# Patient Record
Sex: FEMALE | Race: ASIAN | NOT HISPANIC OR LATINO | Employment: FULL TIME | URBAN - METROPOLITAN AREA
[De-identification: names, ages, dates, MRNs, and addresses within clinical notes are randomized per-mention and may not be internally consistent; named-entity substitution may affect disease eponyms.]

---

## 2017-07-20 ENCOUNTER — EMPLOYEE HEALTH (OUTPATIENT)
Dept: OCCUPATIONAL MEDICINE | Facility: CLINIC | Age: 42
End: 2017-07-20

## 2017-07-20 ENCOUNTER — EH NON-PROVIDER (OUTPATIENT)
Dept: OCCUPATIONAL MEDICINE | Facility: CLINIC | Age: 42
End: 2017-07-20

## 2017-07-20 ENCOUNTER — HOSPITAL ENCOUNTER (OUTPATIENT)
Facility: MEDICAL CENTER | Age: 42
End: 2017-07-20
Attending: PREVENTIVE MEDICINE
Payer: COMMERCIAL

## 2017-07-20 VITALS
WEIGHT: 99 LBS | HEIGHT: 64 IN | BODY MASS INDEX: 16.9 KG/M2 | RESPIRATION RATE: 16 BRPM | DIASTOLIC BLOOD PRESSURE: 84 MMHG | SYSTOLIC BLOOD PRESSURE: 108 MMHG | OXYGEN SATURATION: 97 % | HEART RATE: 80 BPM | TEMPERATURE: 97.9 F

## 2017-07-20 DIAGNOSIS — Z02.1 PRE-EMPLOYMENT DRUG SCREENING: ICD-10-CM

## 2017-07-20 DIAGNOSIS — Z02.1 PHYSICAL EXAM, PRE-EMPLOYMENT: ICD-10-CM

## 2017-07-20 LAB
AMP AMPHETAMINE: NORMAL
BAR BARBITURATES: NORMAL
BZO BENZODIAZEPINES: NORMAL
COC COCAINE: NORMAL
INT CON NEG: NORMAL
INT CON POS: NORMAL
MDMA ECSTASY: NORMAL
MET METHAMPHETAMINES: NORMAL
MTD METHADONE: NORMAL
OPI OPIATES: NORMAL
OXY OXYCODONE: NORMAL
PCP PHENCYCLIDINE: NORMAL
POC URINE DRUG SCREEN OCDRS: NEGATIVE
THC: NORMAL
VZV IGG SER IA-ACNC: POSITIVE

## 2017-07-20 PROCEDURE — 86787 VARICELLA-ZOSTER ANTIBODY: CPT | Performed by: PREVENTIVE MEDICINE

## 2017-07-20 PROCEDURE — 8915 PR COMPREHENSIVE PHYSICAL: Performed by: PREVENTIVE MEDICINE

## 2017-07-20 PROCEDURE — 86480 TB TEST CELL IMMUN MEASURE: CPT | Performed by: PREVENTIVE MEDICINE

## 2017-07-20 PROCEDURE — 94375 RESPIRATORY FLOW VOLUME LOOP: CPT | Performed by: PREVENTIVE MEDICINE

## 2017-07-20 PROCEDURE — 80305 DRUG TEST PRSMV DIR OPT OBS: CPT | Performed by: PREVENTIVE MEDICINE

## 2017-07-20 ASSESSMENT — VISUAL ACUITY
OD_CC: 20/25
OS_CC: 20/50

## 2017-07-20 NOTE — MR AVS SNAPSHOT
Patti Garciaann-marie   2017 8:40 AM   Employee Health   MRN: [id number]    Department:  [facility]   Dept Phone:  [phone]    Description:  Female : 1975   Provider:  Sagar Ham D.O.           Allergies as of 2017     Not on File      You were diagnosed with     Physical exam, pre-employment   [[id number]]         Basic Information     Date Of Birth Sex Race Ethnicity Preferred Language    1975 Female  Non- English      Health Maintenance     Patient has no pending health maintenance at this time      Current Immunizations     No immunizations on file.      Below and/or attached are the medications your provider expects you to take. Review all of your home medications and newly ordered medications with your provider and/or pharmacist. Follow medication instructions as directed by your provider and/or pharmacist. Please keep your medication list with you and share with your provider. Update the information when medications are discontinued, doses are changed, or new medications (including over-the-counter products) are added; and carry medication information at all times in the event of emergency situations     Allergies:  No Known Allergies          Medications  Valid as of: 2017 -  8:58 AM    Generic Name Brand Name Tablet Size Instructions for use    .                 Medicines prescribed today were sent to:     None      Medication refill instructions:       If your prescription bottle indicates you have medication refills left, it is not necessary to call your provider’s office. Please contact your pharmacy and they will refill your medication.    If your prescription bottle indicates you do not have any refills left, you may request refills at any time through one of the following ways: The online [employer] system (except Urgent Care), by calling your provider’s office, or by asking your pharmacy to contact your provider’s office with a refill request.  Medication refills are processed only during regular business hours and may not be available until the next business day. Your provider may request additional information or to have a follow-up visit with you prior to refilling your medication.   *Please Note: Medication refills are assigned a new Rx number when refilled electronically. Your pharmacy may indicate that no refills were authorized even though a new prescription for the same medication is available at the pharmacy. Please request the medicine by name with the pharmacy before contacting your provider for a refill.           [employer] Access Code: Activation code not generated  Current [employer] Status: Active

## 2017-07-21 LAB
M TB TUBERC IFN-G BLD QL: NEGATIVE
M TB TUBERC IFN-G/MITOGEN IGNF BLD: 0
M TB TUBERC IGNF/MITOGEN IGNF CONTROL: 5.83 [IU]/ML
MITOGEN IGNF BCKGRD COR BLD-ACNC: 0.05 [IU]/ML

## 2017-08-04 ENCOUNTER — OFFICE VISIT (OUTPATIENT)
Dept: URGENT CARE | Facility: CLINIC | Age: 42
End: 2017-08-04
Payer: COMMERCIAL

## 2017-08-04 VITALS
SYSTOLIC BLOOD PRESSURE: 100 MMHG | BODY MASS INDEX: 17.07 KG/M2 | WEIGHT: 100 LBS | HEIGHT: 64 IN | DIASTOLIC BLOOD PRESSURE: 70 MMHG | OXYGEN SATURATION: 98 % | TEMPERATURE: 98.4 F | RESPIRATION RATE: 15 BRPM | HEART RATE: 70 BPM

## 2017-08-04 DIAGNOSIS — H10.33 ACUTE BACTERIAL CONJUNCTIVITIS OF BOTH EYES: ICD-10-CM

## 2017-08-04 PROCEDURE — 99203 OFFICE O/P NEW LOW 30 MIN: CPT | Performed by: PHYSICIAN ASSISTANT

## 2017-08-04 RX ORDER — OFLOXACIN 3 MG/ML
SOLUTION/ DROPS OPHTHALMIC
Qty: 5 ML | Refills: 1 | Status: SHIPPED | OUTPATIENT
Start: 2017-08-04 | End: 2017-11-29

## 2017-08-04 NOTE — MR AVS SNAPSHOT
"Patti Clinton   2017 6:30 PM   Office Visit   MRN: [id number]    Department:  [facility]   Dept Phone:  [phone]    Description:  Female : 1975   Provider:  Kofi Walters PA-C           Reason for Visit     Conjunctivitis discharge, x2days, bumps top eye lid      Allergies as of 2017     Not on File      You were diagnosed with     Acute bacterial conjunctivitis of both eyes   [[id number]]         Vital Signs     Blood Pressure Pulse Temperature Respirations Height Weight    100/70 mmHg 70 36.9 °C (98.4 °F) 15 1.626 m (5' 4\") 45.36 kg (100 lb)    Body Mass Index Oxygen Saturation                17.16 kg/m2 98%          Basic Information     Date Of Birth Sex Race Ethnicity Preferred Language    1975 Female  Non- English      Health Maintenance        Date Due Completion Dates    IMM DTaP/Tdap/Td Vaccine (1 - Tdap) 3/14/1992 ---    PAP SMEAR 3/14/1994 ---    MAMMOGRAM 3/14/2013 ---    IMM INFLUENZA (1) 2017 ---            Current Immunizations     No immunizations on file.      Below and/or attached are the medications your provider expects you to take. Review all of your home medications and newly ordered medications with your provider and/or pharmacist. Follow medication instructions as directed by your provider and/or pharmacist. Please keep your medication list with you and share with your provider. Update the information when medications are discontinued, doses are changed, or new medications (including over-the-counter products) are added; and carry medication information at all times in the event of emergency situations     Allergies:  No Known Allergies          Medications  Valid as of: 2017 -  7:14 PM    Generic Name Brand Name Tablet Size Instructions for use    Ofloxacin (Solution) OCUFLOX 0.3 % 1-2 DROPS TO AFFECTED EYE EVERY 2-4 HOURS WHILE AWAKE. USE FOR ONE EXTRA DAY AFTER BETTER.        .                 Medicines prescribed today " were sent to:     [id number]  [place], [place] - [address] AT [facility]    [address] [place] [address]    Phone: [phone] Fax: [phone]    Open 24 Hours?: No      Medication refill instructions:       If your prescription bottle indicates you have medication refills left, it is not necessary to call your provider’s office. Please contact your pharmacy and they will refill your medication.    If your prescription bottle indicates you do not have any refills left, you may request refills at any time through one of the following ways: The online [employer] system (except Urgent Care), by calling your provider’s office, or by asking your pharmacy to contact your provider’s office with a refill request. Medication refills are processed only during regular business hours and may not be available until the next business day. Your provider may request additional information or to have a follow-up visit with you prior to refilling your medication.   *Please Note: Medication refills are assigned a new Rx number when refilled electronically. Your pharmacy may indicate that no refills were authorized even though a new prescription for the same medication is available at the pharmacy. Please request the medicine by name with the pharmacy before contacting your provider for a refill.        Instructions    Bacterial Conjunctivitis  Bacterial conjunctivitis, commonly called pink eye, is an inflammation of the clear membrane that covers the white part of the eye (conjunctiva). The inflammation can also happen on the underside of the eyelids. The blood vessels in the conjunctiva become inflamed, causing the eye to become red or pink. Bacterial conjunctivitis may spread easily from one eye to another and from person to person (contagious).   CAUSES   Bacterial conjunctivitis is caused by bacteria. The bacteria may come from your own skin, your upper respiratory tract, or  from someone else with bacterial conjunctivitis.  SYMPTOMS   The normally white color of the eye or the underside of the eyelid is usually pink or red. The pink eye is usually associated with irritation, tearing, and some sensitivity to light. Bacterial conjunctivitis is often associated with a thick, yellowish discharge from the eye. The discharge may turn into a crust on the eyelids overnight, which causes your eyelids to stick together. If a discharge is present, there may also be some blurred vision in the affected eye.  DIAGNOSIS   Bacterial conjunctivitis is diagnosed by your caregiver through an eye exam and the symptoms that you report. Your caregiver looks for changes in the surface tissues of your eyes, which may point to the specific type of conjunctivitis. A sample of any discharge may be collected on a cotton-tip swab if you have a severe case of conjunctivitis, if your cornea is affected, or if you keep getting repeat infections that do not respond to treatment. The sample will be sent to a lab to see if the inflammation is caused by a bacterial infection and to see if the infection will respond to antibiotic medicines.  TREATMENT   · Bacterial conjunctivitis is treated with antibiotics. Antibiotic eyedrops are most often used. However, antibiotic ointments are also available. Antibiotics pills are sometimes used. Artificial tears or eye washes may ease discomfort.  HOME CARE INSTRUCTIONS   · To ease discomfort, apply a cool, clean washcloth to your eye for 10-20 minutes, 3-4 times a day.  · Gently wipe away any drainage from your eye with a warm, wet washcloth or a cotton ball.  · Wash your hands often with soap and water. Use paper towels to dry your hands.  · Do not share towels or washcloths. This may spread the infection.  · Change or wash your pillowcase every day.  · You should not use eye makeup until the infection is gone.  · Do not operate machinery or drive if your vision is blurred.  · Stop  using contact lenses. Ask your caregiver how to sterilize or replace your contacts before using them again. This depends on the type of contact lenses that you use.  · When applying medicine to the infected eye, do not touch the edge of your eyelid with the eyedrop bottle or ointment tube.  SEEK IMMEDIATE MEDICAL CARE IF:   · Your infection has not improved within 3 days after beginning treatment.  · You had yellow discharge from your eye and it returns.  · You have increased eye pain.  · Your eye redness is spreading.  · Your vision becomes blurred.  · You have a fever or persistent symptoms for more than 2-3 days.  · You have a fever and your symptoms suddenly get worse.  · You have facial pain, redness, or swelling.  MAKE SURE YOU:   · Understand these instructions.  · Will watch your condition.  · Will get help right away if you are not doing well or get worse.     This information is not intended to replace advice given to you by your health care provider. Make sure you discuss any questions you have with your health care provider.     Document Released: 12/18/2006 Document Revised: 01/08/2016 Document Reviewed: 05/20/2013  [employer] Interactive Patient Education ©2016 [employer] Inc.            [employer] Access Code: Activation code not generated  Current Sponge Status: Active

## 2017-08-05 ASSESSMENT — ENCOUNTER SYMPTOMS
EYE REDNESS: 1
EYE DISCHARGE: 1
BLURRED VISION: 1
CONSTITUTIONAL NEGATIVE: 1
CARDIOVASCULAR NEGATIVE: 1
RESPIRATORY NEGATIVE: 1
PHOTOPHOBIA: 1
SORE THROAT: 0
MUSCULOSKELETAL NEGATIVE: 1
EYE PAIN: 1
DOUBLE VISION: 0

## 2017-08-05 NOTE — PROGRESS NOTES
"Subjective:      Patti Clinton is a 42 y.o. female who presents with Conjunctivitis            Conjunctivitis  Pertinent negatives include no congestion or sore throat.     Chief Complaint   Patient presents with   • Conjunctivitis     discharge, x2days, bumps top eye lid       HPI:  Patti Clinton is a 42 y.o. female who presents with eye discharge and sensation of bumps along the upper lid x 3 days. Started in right eye and palpated a stye in the upper lid.  Having greenish to yellow exudate.  Left started 2 days ago.  Gritty sensation with pain with light.  No change in vision.  She wears contact lenses, none today.  Patient denies HA, SOB, chest pain, palpitations, fever, chills, or n/v/d.    Presents with  and daughter.    No past medical history on file.    No past surgical history on file.    No family history on file.    Social History     Social History   • Marital Status:      Spouse Name: N/A   • Number of Children: N/A   • Years of Education: N/A     Occupational History   • Not on file.     Social History Main Topics   • Smoking status: Not on file   • Smokeless tobacco: Not on file   • Alcohol Use: Not on file   • Drug Use: Not on file   • Sexual Activity: Not on file     Other Topics Concern   • Not on file     Social History Narrative   • No narrative on file       No current outpatient prescriptions on file.    Not on File         Review of Systems   Constitutional: Negative.    HENT: Negative for congestion and sore throat.    Eyes: Positive for blurred vision, photophobia, pain, discharge and redness. Negative for double vision.   Respiratory: Negative.    Cardiovascular: Negative.    Genitourinary: Negative.    Musculoskeletal: Negative.    Skin: Negative.    All other systems reviewed and are negative.         Objective:     /70 mmHg  Pulse 70  Temp(Src) 36.9 °C (98.4 °F)  Resp 15  Ht 1.626 m (5' 4\")  Wt 45.36 kg (100 lb)  BMI 17.16 kg/m2  SpO2 98%     Physical " Exam       Nursing notes and vitals reviewed.    Constitutional:   Appropriately groomed, pleasant affect, well nourished, in NAD.    Head:   Normocephalic, atraumatic.    Eyes:   PERRLA, EOM's full, Right and Left eye: sclera injected, conjunctiva erythematous, and medial canthus with yellow exudate.  No preauricular lymphadenopathy.  Left eye with subtle ciliary flush medial aspect    No foreign body identified.  No uptake with fluorescein dye bilaterally.  Eyes irrigated copiously with sterile saline.  Patient tolerated well.    Ears:  Hearing grossly intact to voice.    Nose:  Nares patent bilaterally.  Nasal mucosa not edematous.      Throat:  Dentition wnl, mucosa moist without lesions.  Oropharynx not erythematous, with no enlargement of the palatine tonsils bilaterally with no exudates.    No post nasal drainage present.  Soft palate rises symmetrically bilaterally and uvula midline.      Neck: Neck supple, with mild anterior lymphadenopathy that is soft and mobile to palpation. Thyroid non-palpable without tenderness or nodules. No supraclavicular lymphadenopathy.    Lungs:  Respiratory effort not labored without accessory muscle use.     Musculoskeletal:  Gait non-antalgic with a narrow base.    Derm:  Skin without rashes or lesions with good turgor pressure.      Psychiatric:  Mood, affect, and judgement appropriate.         Assessment/Plan:     1. Acute bacterial conjunctivitis of both eyes  ofloxacin (OCUFLOX) 0.3 % Solution     Patient presents with bilateral conjunctivitis with yellow to green exudate. No uptake fluorescein dye. No evidence of bacterial keratitis. Patient is a contact lens wearer. She has self treated ×1 treatment with azithromycin. Prescribed ofloxacin eyedrops recommended presenting to ophthalmology if not improving within 24 hours. Reviewed symptoms support measures and good hand hygiene protocol.    Patient was in agreement with this treatment plan and seemed to understand without  barriers. All questions were encouraged and answered.  Reviewed signs and symptoms of when to seek emergency medical care.     Please note that this dictation was created using voice recognition software.  I have made every reasonable attempt to correct obvious errors, but I expect there are errors of kate and possibly content that I did not discover before finalizing the note.

## 2017-08-05 NOTE — PATIENT INSTRUCTIONS
Bacterial Conjunctivitis  Bacterial conjunctivitis, commonly called pink eye, is an inflammation of the clear membrane that covers the white part of the eye (conjunctiva). The inflammation can also happen on the underside of the eyelids. The blood vessels in the conjunctiva become inflamed, causing the eye to become red or pink. Bacterial conjunctivitis may spread easily from one eye to another and from person to person (contagious).   CAUSES   Bacterial conjunctivitis is caused by bacteria. The bacteria may come from your own skin, your upper respiratory tract, or from someone else with bacterial conjunctivitis.  SYMPTOMS   The normally white color of the eye or the underside of the eyelid is usually pink or red. The pink eye is usually associated with irritation, tearing, and some sensitivity to light. Bacterial conjunctivitis is often associated with a thick, yellowish discharge from the eye. The discharge may turn into a crust on the eyelids overnight, which causes your eyelids to stick together. If a discharge is present, there may also be some blurred vision in the affected eye.  DIAGNOSIS   Bacterial conjunctivitis is diagnosed by your caregiver through an eye exam and the symptoms that you report. Your caregiver looks for changes in the surface tissues of your eyes, which may point to the specific type of conjunctivitis. A sample of any discharge may be collected on a cotton-tip swab if you have a severe case of conjunctivitis, if your cornea is affected, or if you keep getting repeat infections that do not respond to treatment. The sample will be sent to a lab to see if the inflammation is caused by a bacterial infection and to see if the infection will respond to antibiotic medicines.  TREATMENT   · Bacterial conjunctivitis is treated with antibiotics. Antibiotic eyedrops are most often used. However, antibiotic ointments are also available. Antibiotics pills are sometimes used. Artificial tears or eye  washes may ease discomfort.  HOME CARE INSTRUCTIONS   · To ease discomfort, apply a cool, clean washcloth to your eye for 10-20 minutes, 3-4 times a day.  · Gently wipe away any drainage from your eye with a warm, wet washcloth or a cotton ball.  · Wash your hands often with soap and water. Use paper towels to dry your hands.  · Do not share towels or washcloths. This may spread the infection.  · Change or wash your pillowcase every day.  · You should not use eye makeup until the infection is gone.  · Do not operate machinery or drive if your vision is blurred.  · Stop using contact lenses. Ask your caregiver how to sterilize or replace your contacts before using them again. This depends on the type of contact lenses that you use.  · When applying medicine to the infected eye, do not touch the edge of your eyelid with the eyedrop bottle or ointment tube.  SEEK IMMEDIATE MEDICAL CARE IF:   · Your infection has not improved within 3 days after beginning treatment.  · You had yellow discharge from your eye and it returns.  · You have increased eye pain.  · Your eye redness is spreading.  · Your vision becomes blurred.  · You have a fever or persistent symptoms for more than 2-3 days.  · You have a fever and your symptoms suddenly get worse.  · You have facial pain, redness, or swelling.  MAKE SURE YOU:   · Understand these instructions.  · Will watch your condition.  · Will get help right away if you are not doing well or get worse.     This information is not intended to replace advice given to you by your health care provider. Make sure you discuss any questions you have with your health care provider.     Document Released: 12/18/2006 Document Revised: 01/08/2016 Document Reviewed: 05/20/2013  [employer] Interactive Patient Education ©2016 [employer]

## 2017-08-12 ENCOUNTER — HOSPITAL ENCOUNTER (OUTPATIENT)
Dept: LAB | Facility: MEDICAL CENTER | Age: 42
End: 2017-08-12

## 2017-08-12 LAB
BDY FAT % MEASURED: 19.9 %
BP DIAS: 59 MMHG
BP SYS: 106 MMHG
CHOLEST SERPL-MCNC: 139 MG/DL (ref 100–199)
DIABETES HTDIA: NO
EVENT NAME HTEVT: NORMAL
FASTING HTFAS: YES
GLUCOSE SERPL-MCNC: 87 MG/DL (ref 65–99)
HDLC SERPL-MCNC: 67 MG/DL
HYPERTENSION HTHYP: NO
LDLC SERPL CALC-MCNC: 62 MG/DL
SCREENING LOC CITY HTCIT: NORMAL
SCREENING LOC STATE HTSTA: NORMAL
SCREENING LOCATION HTLOC: NORMAL
SMOKING HTSMO: NO
SUBSCRIBER ID HTSID: NORMAL
TRIGL SERPL-MCNC: 48 MG/DL (ref 0–149)

## 2017-08-12 PROCEDURE — S5190 WELLNESS ASSESSMENT BY NONPH: HCPCS

## 2017-08-12 PROCEDURE — 80061 LIPID PANEL: CPT

## 2017-08-12 PROCEDURE — 82947 ASSAY GLUCOSE BLOOD QUANT: CPT

## 2017-10-04 ENCOUNTER — APPOINTMENT (OUTPATIENT)
Dept: MEDICAL GROUP | Facility: MEDICAL CENTER | Age: 42
End: 2017-10-04
Payer: COMMERCIAL

## 2017-11-29 ENCOUNTER — OFFICE VISIT (OUTPATIENT)
Dept: MEDICAL GROUP | Facility: MEDICAL CENTER | Age: 42
End: 2017-11-29
Payer: COMMERCIAL

## 2017-11-29 VITALS
TEMPERATURE: 97.5 F | DIASTOLIC BLOOD PRESSURE: 84 MMHG | SYSTOLIC BLOOD PRESSURE: 120 MMHG | WEIGHT: 101.85 LBS | BODY MASS INDEX: 17.39 KG/M2 | HEART RATE: 73 BPM | OXYGEN SATURATION: 98 % | HEIGHT: 64 IN | RESPIRATION RATE: 14 BRPM

## 2017-11-29 DIAGNOSIS — Z00.00 WELLNESS EXAMINATION: ICD-10-CM

## 2017-11-29 PROCEDURE — 99396 PREV VISIT EST AGE 40-64: CPT | Performed by: FAMILY MEDICINE

## 2017-11-29 RX ORDER — PROMETHAZINE HYDROCHLORIDE AND CODEINE PHOSPHATE 6.25; 1 MG/5ML; MG/5ML
5 SYRUP ORAL 4 TIMES DAILY PRN
Qty: 200 ML | Refills: 0 | Status: SHIPPED | OUTPATIENT
Start: 2017-11-29 | End: 2017-12-09

## 2017-11-29 ASSESSMENT — PATIENT HEALTH QUESTIONNAIRE - PHQ9: CLINICAL INTERPRETATION OF PHQ2 SCORE: 0

## 2017-11-30 NOTE — PROGRESS NOTES
CC: Establish care, new employee at [employer]    HISTORY OF THE PRESENT ILLNESS: Patient is a 42 y.o. female. This pleasant patient is here today to establish care. 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 She is very healthy and does not typically have health problems. She is currently getting over a cold and has a persistent cough that is nonproductive. She has no fevers chills, facial pain, sore throat, ear pain or headache associated with this.    Health Maintenance: Completed      No problem-specific Assessment & Plan notes found for this encounter.      Allergies: Patient has no known allergies.    Current Outpatient Prescriptions Ordered in [employer]   Medication Sig Dispense Refill   • promethazine-codeine (PHENERGAN-CODEINE) 6.25-10 MG/5ML Syrup Take 5 mL by mouth 4 times a day as needed for Cough for up to 10 days. 200 mL 0     No current Epic-ordered facility-administered medications on file.        History reviewed. No pertinent past medical history.    History reviewed. No pertinent surgical history.    Social History   Substance Use Topics   • Smoking status: Never Smoker   • Smokeless tobacco: Never Used   • Alcohol use No     Social History     Social History Narrative    Xray tech with [employer],  with a 2 year old daughter, from [employer] originally, moved to [place] from [place]       Family History   Problem Relation Age of Onset   • No Known Problems Mother    • No Known Problems Father        ROS:     - Constitutional: Negative for fever, chills, unexpected weight change, and fatigue/generalized weakness.     - HEENT: Negative for headaches, vision changes, hearing changes, ear pain, ear discharge, rhinorrhea, sinus congestion, sore throat, and neck pain.      - Respiratory:Positive for nonproductive cough after URI      - Cardiovascular: Negative for chest pain, palpitations, orthopnea, and bilateral lower extremity edema.     - Gastrointestinal: Negative for heartburn, nausea, vomiting, abdominal pain,  "hematochezia, melena, diarrhea, constipation, and greasy/foul-smelling stools.     - Genitourinary: Negative for dysuria, polyuria, hematuria, pyuria, urinary urgency, and urinary incontinence.    - Musculoskeletal: Negative for myalgias, back pain, and joint pain.     - Skin: Negative for rash, itching, cyanotic skin color change.     - Neurological: Negative for dizziness, tingling, tremors, focal sensory deficit, focal weakness and headaches.     - Endo/Heme/Allergies: Does not bruise/bleed easily.     - Psychiatric/Behavioral: Negative for depression, suicidal/homicidal ideation and memory loss.        - NOTE: All other systems reviewed and are negative, except as in HPI.      .      Exam: Blood pressure 120/84, pulse 73, temperature 36.4 °C (97.5 °F), resp. rate 14, height 1.626 m (5' 4\"), weight 46.2 kg (101 lb 13.6 oz), last menstrual period 11/29/2017, SpO2 98 %, not currently breastfeeding. Body mass index is 17.48 kg/m².    General: Normal appearing. No distress.  HEENT: Normocephalic. Eyes conjunctiva clear lids without ptosis, pupils equal and reactive to light accommodation, ears normal shape and contour, canals are clear bilaterally, tympanic membranes are benign, nasal mucosa benign, oropharynx is without erythema, edema or exudates.   Neck: Supple without JVD or bruit. Thyroid is not enlarged.  Pulmonary: Clear to ausculation.  Normal effort. No rales, ronchi, or wheezing.  Cardiovascular: Regular rate and rhythm without murmur. Carotid and radial pulses are intact and equal bilaterally.  Abdomen: Soft, nontender, nondistended. Normal bowel sounds. Liver and spleen are not palpable  Neurologic: Grossly nonfocal  Lymph: No cervical, supraclavicular or axillary lymph nodes are palpable  Skin: Warm and dry.  No obvious lesions.  Musculoskeletal: Normal gait. No extremity cyanosis, clubbing, or edema.  Psych: Normal mood and affect. Alert and oriented x3. Judgment and insight is normal.    Please note " that this dictation was created using voice recognition software. I have made every reasonable attempt to correct obvious errors, but I expect that there are errors of grammar and possibly content that I did not discover before finalizing the note.      Assessment/Plan  1. Wellness examination  Completed today. Patient is up-to-date on her Pap and mammogram which were completed in [place]. Unfortunately I cannot get those records. Patient has not gotten a flu shot but chooses to wear a mask at work. We will see her back for any problems.

## 2018-06-30 ENCOUNTER — HOSPITAL ENCOUNTER (OUTPATIENT)
Dept: LAB | Facility: MEDICAL CENTER | Age: 43
End: 2018-06-30
Payer: COMMERCIAL

## 2018-06-30 LAB
BDY FAT % MEASURED: 19.3 %
BP DIAS: 75 MMHG
BP SYS: 105 MMHG
CHOLEST SERPL-MCNC: 122 MG/DL (ref 100–199)
DIABETES HTDIA: NO
EVENT NAME HTEVT: NORMAL
FASTING HTFAS: YES
GLUCOSE SERPL-MCNC: 84 MG/DL (ref 65–99)
HDLC SERPL-MCNC: 54 MG/DL
HYPERTENSION HTHYP: NO
LDLC SERPL CALC-MCNC: 55 MG/DL
SCREENING LOC CITY HTCIT: NORMAL
SCREENING LOC STATE HTSTA: NORMAL
SCREENING LOCATION HTLOC: NORMAL
SMOKING HTSMO: NO
SUBSCRIBER ID HTSID: NORMAL
TRIGL SERPL-MCNC: 67 MG/DL (ref 0–149)

## 2018-06-30 PROCEDURE — 36415 COLL VENOUS BLD VENIPUNCTURE: CPT

## 2018-06-30 PROCEDURE — S5190 WELLNESS ASSESSMENT BY NONPH: HCPCS

## 2018-06-30 PROCEDURE — 82947 ASSAY GLUCOSE BLOOD QUANT: CPT

## 2018-06-30 PROCEDURE — 80061 LIPID PANEL: CPT

## 2018-12-31 ENCOUNTER — NON-PROVIDER VISIT (OUTPATIENT)
Dept: URGENT CARE | Facility: CLINIC | Age: 43
End: 2018-12-31
Payer: COMMERCIAL

## 2018-12-31 DIAGNOSIS — Z23 NEED FOR INFLUENZA VACCINATION: ICD-10-CM

## 2018-12-31 PROCEDURE — 90686 IIV4 VACC NO PRSV 0.5 ML IM: CPT | Performed by: EMERGENCY MEDICINE

## 2018-12-31 PROCEDURE — 90471 IMMUNIZATION ADMIN: CPT | Performed by: EMERGENCY MEDICINE

## 2019-07-27 ENCOUNTER — APPOINTMENT (OUTPATIENT)
Dept: URGENT CARE | Facility: CLINIC | Age: 44
End: 2019-07-27

## 2019-07-27 DIAGNOSIS — Z02.1 PRE-EMPLOYMENT HEALTH SCREENING EXAMINATION: Primary | ICD-10-CM

## 2019-07-27 PROCEDURE — 86787 VARICELLA-ZOSTER ANTIBODY: CPT | Performed by: NURSE PRACTITIONER

## 2019-07-27 PROCEDURE — 86480 TB TEST CELL IMMUN MEASURE: CPT | Performed by: NURSE PRACTITIONER

## 2019-07-30 LAB — VZV IGG SER IA-ACNC: NORMAL

## 2019-07-31 LAB
GAMMA INTERFERON BACKGROUND BLD IA-ACNC: 0.03 IU/ML
M TB IFN-G BLD-IMP: NEGATIVE
M TB IFN-G CD4+ BCKGRND COR BLD-ACNC: 0 IU/ML
M TB IFN-G CD4+ BCKGRND COR BLD-ACNC: 0.01 IU/ML
MITOGEN IGNF BCKGRD COR BLD-ACNC: >10 IU/ML